# Patient Record
Sex: FEMALE | Race: BLACK OR AFRICAN AMERICAN | NOT HISPANIC OR LATINO | Employment: UNEMPLOYED | URBAN - METROPOLITAN AREA
[De-identification: names, ages, dates, MRNs, and addresses within clinical notes are randomized per-mention and may not be internally consistent; named-entity substitution may affect disease eponyms.]

---
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
## 2018-03-22 ENCOUNTER — TELEPHONE (OUTPATIENT)
Dept: ADMINISTRATIVE | Facility: HOSPITAL | Age: 18
End: 2018-03-22

## 2018-06-20 PROBLEM — N30.00 ACUTE CYSTITIS WITHOUT HEMATURIA: Status: ACTIVE | Noted: 2018-06-20

## 2018-07-18 PROBLEM — J20.9 ACUTE BRONCHITIS: Status: ACTIVE | Noted: 2018-07-18

## 2018-07-18 PROBLEM — S76.012A HIP STRAIN, LEFT, INITIAL ENCOUNTER: Status: ACTIVE | Noted: 2018-07-18

## 2018-07-18 PROBLEM — J30.9 ALLERGIC RHINITIS: Status: ACTIVE | Noted: 2018-07-18

## 2018-11-05 PROBLEM — Z3A.39 39 WEEKS GESTATION OF PREGNANCY: Status: RESOLVED | Noted: 2017-11-28 | Resolved: 2018-11-05

## 2019-04-08 ENCOUNTER — PATIENT OUTREACH (OUTPATIENT)
Dept: ADMINISTRATIVE | Facility: HOSPITAL | Age: 19
End: 2019-04-08

## 2021-05-18 PROBLEM — O21.9 VOMITING DURING PREGNANCY: Status: ACTIVE | Noted: 2021-05-18

## 2022-05-12 ENCOUNTER — TELEPHONE (OUTPATIENT)
Dept: SURGERY | Facility: CLINIC | Age: 22
End: 2022-05-12
Payer: MEDICAID

## 2022-05-12 NOTE — TELEPHONE ENCOUNTER
----- Message from Yoli Zhang sent at 5/12/2022  8:36 AM CDT -----  Troyriana Johnson calling regarding Appointment Access  (message) for #Breast hypertrophy. There is a referral in the system.  [phone]       Looked at the Referral.  Patient was referred to Dr. Mari.  Sent an In Basket message to Carolynn Weinstein NP to let her know that Dr. Mari does not perform breast surgery and that Medicaid is not accepted by the Plastic Surgeon at [facility] for Breast Reduction Surgery.  Suggested [facility].     Called and spoke to Patient.  Explained that above information.  She did not have any questions.

## 2022-08-02 ENCOUNTER — TELEPHONE (OUTPATIENT)
Dept: PLASTIC SURGERY | Facility: CLINIC | Age: 22
End: 2022-08-02
Payer: MEDICAID

## 2022-08-08 ENCOUNTER — TELEPHONE (OUTPATIENT)
Dept: PLASTIC SURGERY | Facility: CLINIC | Age: 22
End: 2022-08-08
Payer: MEDICAID

## 2022-08-19 DIAGNOSIS — N62 HYPERTROPHY OF BREAST: Primary | ICD-10-CM

## 2022-09-12 ENCOUNTER — OFFICE VISIT (OUTPATIENT)
Dept: PLASTIC SURGERY | Facility: CLINIC | Age: 22
End: 2022-09-12
Payer: MEDICAID

## 2022-09-12 VITALS
HEIGHT: 66 IN | RESPIRATION RATE: 20 BRPM | WEIGHT: 133 LBS | TEMPERATURE: 98 F | OXYGEN SATURATION: 98 % | SYSTOLIC BLOOD PRESSURE: 110 MMHG | BODY MASS INDEX: 21.38 KG/M2 | HEART RATE: 98 BPM | DIASTOLIC BLOOD PRESSURE: 76 MMHG

## 2022-09-12 DIAGNOSIS — N62 HYPERTROPHY OF BREAST: Primary | ICD-10-CM

## 2022-09-12 PROCEDURE — 99204 PR OFFICE/OUTPT VISIT, NEW, LEVL IV, 45-59 MIN: ICD-10-PCS | Mod: S$PBB,,, | Performed by: SURGERY

## 2022-09-12 PROCEDURE — 99215 OFFICE O/P EST HI 40 MIN: CPT | Mod: PBBFAC | Performed by: SURGERY

## 2022-09-12 PROCEDURE — 3008F PR BODY MASS INDEX (BMI) DOCUMENTED: ICD-10-PCS | Mod: CPTII,,, | Performed by: SURGERY

## 2022-09-12 PROCEDURE — 3078F PR MOST RECENT DIASTOLIC BLOOD PRESSURE < 80 MM HG: ICD-10-PCS | Mod: CPTII,,, | Performed by: SURGERY

## 2022-09-12 PROCEDURE — 3074F SYST BP LT 130 MM HG: CPT | Mod: CPTII,,, | Performed by: SURGERY

## 2022-09-12 PROCEDURE — 1159F PR MEDICATION LIST DOCUMENTED IN MEDICAL RECORD: ICD-10-PCS | Mod: CPTII,,, | Performed by: SURGERY

## 2022-09-12 PROCEDURE — 3074F PR MOST RECENT SYSTOLIC BLOOD PRESSURE < 130 MM HG: ICD-10-PCS | Mod: CPTII,,, | Performed by: SURGERY

## 2022-09-12 PROCEDURE — 3008F BODY MASS INDEX DOCD: CPT | Mod: CPTII,,, | Performed by: SURGERY

## 2022-09-12 PROCEDURE — 99204 OFFICE O/P NEW MOD 45 MIN: CPT | Mod: S$PBB,,, | Performed by: SURGERY

## 2022-09-12 PROCEDURE — 3078F DIAST BP <80 MM HG: CPT | Mod: CPTII,,, | Performed by: SURGERY

## 2022-09-12 PROCEDURE — 1159F MED LIST DOCD IN RCRD: CPT | Mod: CPTII,,, | Performed by: SURGERY

## 2022-09-12 RX ORDER — SODIUM CHLORIDE 9 MG/ML
INJECTION, SOLUTION INTRAVENOUS CONTINUOUS
Status: CANCELLED | OUTPATIENT
Start: 2022-09-12

## 2022-09-12 RX ORDER — ENOXAPARIN SODIUM 100 MG/ML
40 INJECTION SUBCUTANEOUS
Status: CANCELLED | OUTPATIENT
Start: 2022-09-26 | End: 2022-09-26

## 2022-09-12 NOTE — PROGRESS NOTES
General Surgery  History and Physical  2022    SUBJECTIVE:     Chief Complaint: Hypertrophy of the breast    History of Present Illness:  Patient is a 22-year-old female with a diagnosis of bilateral Macromastia, presenting today in clinic for evaluation of bilateral breast reduction. She is currently a bra size of *** She is always had large breasts, but is now larger than before. She complains of severe bra grooving, intertrigo, rashes, and severe debilitating back and neck pain related to her Macromastia. She can no longer perform the activities of daily living. She cannot exercise, cannot work around the house due to her large breasts. These are hindering her quality of life. She has failed conservative measures for the past 6 months. She has tried NSAIDS, supportive garments in the form of multiple types properly fitted bras, and conservative physical therapy exercises- without improvement, although she has been compliant with the duration over the past 6 months. These are exacerbated when she leans or bends over. She has also tried several over the counter creams and powders to relieve these rashes in the past, but without relief. She has yearly mammograms, as well as recommendations from her primary care, that she be evaluated for reduction. She is otherwise healthy, the macromastia is not related to and endocrine or active metabolic process. She does not smoke.     Past Medical History:  Past Medical History:   Diagnosis Date    Hemorrhoids     Migraines     Pregnancy            Home Medications:  Current Outpatient Medications on File Prior to Visit   Medication Sig    ibuprofen (ADVIL,MOTRIN) 800 MG tablet Take 1 tablet (800 mg total) by mouth every 6 (six) hours as needed for Pain. (Patient not taking: Reported on 2022)     No current facility-administered medications on file prior to visit.       Allergies:  Review of patient's allergies indicates:  No Known Allergies    Surgical  "History:  Past Surgical History:   Procedure Laterality Date    DILATION AND CURETTAGE OF UTERUS         Family History:  Family History   Problem Relation Age of Onset    Thyroid disease Mother     Stroke Father        Social History:  Social History     Tobacco Use    Smoking status: Never    Smokeless tobacco: Never   Substance Use Topics    Alcohol use: No    Drug use: No        Review of Systems:  Skin: No rashes or itching.  Head: Denies headache or recent trauma.  Eyes: Denies eye pain or double vision.  Neck: Denies swelling or hoarseness of voice.  Respiratory: Denies shortness of breath or chest pain  Cardiac: Denies palpitations or swelling in hands/feet.  Gastrointestinal: Denies nausea, denies vomiting.   Urinary: Denies dysuria or hematuria.  Vascular: Denies claudication or leg swelling.  Neuro: Denies motor deficits. Denies weakness.  Endocrine: Denies excessive sweating or cold intolerance.  Psych: Denies memory problems. Denies anxiety.      OBJECTIVE:     Vital Signs:  There were no vitals filed for this visit.     Physical Exam:  General: well developed, well nourished, no distress  HEENT: NCAT, EOMI  Neck: supple, symmetrical  Lungs: Normal WOB, No SOB  Cardiovascular: regular rate  Abdomen: soft, nondistended, nontender to palpation  Skin: Skin color, texture, turgor normal. No rashes or lesions  Musculoskeletal:no clubbing, cyanosis, no deformities  Neurologic: No focal numbness or weakness  Psych/Behavioral:  Alert and oriented, appropriate affect.    Height: 5' 6"  Weight:  BSA:  Focused exam reveals large pendulous breasts with grade 3 ptosis.  There are no masses or lymphadenopathy which is palpable.  There is definite bra grooving.  Sternal notch to nipple- *** cm  Nipple to IMF: *** cm    ASSESSMENT:     I had a long discussion with the patient regarding her options. She will likely need bilateral breast reduction which would provide symptomatic relief with an inferior pedicle, Wise-type " pattern. She has recurrent back pain as well as intertrigo which she will receive some relief from bilateral breast reduction. I discussed risks and benefits are her including bleeding, infection, need for further surgery, damage surrounding structures, loss of nipple, loss of nipple sensation, wound dehiscence, especially at the T point of closure. Most notable risk to the patient with a breast reduction is both nipple sensation loss, as well as discoloration and depigmentation of the nipple. I explained to the patient that although we can perform the surgery safely there is always a chance of nipple loss due to the size of her breast and a loss of blood supply to the nipple. If at the time of surgery it appears that the blood supply to the nipple does not look sufficient, I explained her that we would have to convert her to a free nipple graft. This would entail bolsters for approximately 5 days and increased risk of depigmentation the nipple, with 100% sensory loss. I also explained to the patient thoroughly that in no way can we guarantee a postoperative cup size after breast reduction. I explained her that we would make her smaller, but we cannot guarantee a definitive cup size. I explained her that a breast reduction is a functional operation, and our goal is to improve her functional neck pain, back pain, and rashes. I also explained her that there is in no way that we could attain perfect symmetry - one side will always be slightly larger and more asymmetrical compared to the other postoperatively. She understands this and wishes to proceed. She understands all these risks and she is well informed. We will check with her insurance to see if they will cover this.    PLAN:     - ***need photographs (AP and 2 lateral views)***  - Schedule bilateral breast reduction ***  - Based on preoperative Schnur Sliding scale assessment using height, weight and BSA, I would predict *** grams of tissue per breast to be  removed.       Winston Lambert, MS3  [facility] General Surgery

## 2022-09-12 NOTE — PROGRESS NOTES
Patient seen and examined by Dr. MIRIAN Baird. Scheduled for surgery 9/26/22. Instructions given for preoperative phone interview. All Questions asked and answered. Written and verbal discharge instructions given.

## 2022-09-12 NOTE — PROGRESS NOTES
General Surgery  History and Physical  2022    SUBJECTIVE:     Chief Complaint: Hypertrophy of the breast    History of Present Illness:  Patient is a 22-year-old female with a diagnosis of bilateral Macromastia, presenting today in clinic for evaluation of bilateral breast reduction. She is currently a bra size of G34. She is always had large breasts, but is now larger than before. She complains of severe bra grooving, intertrigo, rashes, and severe debilitating back and neck pain related to her Macromastia. She can no longer perform the activities of daily living. She cannot exercise, cannot work around the house due to her large breasts. These are hindering her quality of life. She has failed conservative measures for the past 6 months. She has tried NSAIDS, supportive garments in the form of multiple types properly fitted bras, and conservative physical therapy exercises- without improvement, although she has been compliant with the duration over the past 6 months. These are exacerbated when she leans or bends over. She has also tried several over the counter creams and powders to relieve these rashes in the past, but without relief. She has yearly mammograms, as well as recommendations from her primary care, that she be evaluated for reduction. She is otherwise healthy, the macromastia is not related to and endocrine or active metabolic process. She smokes occasionally, but stated that she will not smoke until surgery.     Past Medical History:  Past Medical History:   Diagnosis Date    Hemorrhoids     Migraines     Pregnancy            Home Medications:  Current Outpatient Medications on File Prior to Visit   Medication Sig    ibuprofen (ADVIL,MOTRIN) 800 MG tablet Take 1 tablet (800 mg total) by mouth every 6 (six) hours as needed for Pain. (Patient not taking: Reported on 2022)     No current facility-administered medications on file prior to visit.       Allergies:  Review of patient's  "allergies indicates:  No Known Allergies    Surgical History:  Past Surgical History:   Procedure Laterality Date    DILATION AND CURETTAGE OF UTERUS         Family History:  Family History   Problem Relation Age of Onset    Thyroid disease Mother     Arthritis Mother     Miscarriages / Stillbirths Mother     Stroke Father     Diabetes Paternal Grandfather     Arthritis Paternal Grandmother     Cancer Paternal Grandmother     Arthritis Maternal Aunt     Asthma Daughter     Diabetes Maternal Uncle        Social History:  Social History     Tobacco Use    Smoking status: Never    Smokeless tobacco: Never   Substance Use Topics    Alcohol use: No    Drug use: No        Review of Systems:  Skin: No rashes or itching.  Head: Denies headache or recent trauma.  Eyes: Denies eye pain or double vision.  Neck: Denies swelling or hoarseness of voice.  Respiratory: Denies shortness of breath or chest pain  Cardiac: Denies palpitations or swelling in hands/feet.  Gastrointestinal: Denies nausea, denies vomiting.   Urinary: Denies dysuria or hematuria.  Vascular: Denies claudication or leg swelling.  Neuro: Denies motor deficits. Denies weakness.  Endocrine: Denies excessive sweating or cold intolerance.  Psych: Denies memory problems. Denies anxiety.      OBJECTIVE:     Vital Signs:  Vitals:    09/12/22 1332   BP: 110/76   Pulse: 98   Resp: 20   Temp: 98.2 °F (36.8 °C)   Patient Weight: 60.3kg    Physical Exam:  General: well developed, well nourished, no distress  HEENT: NCAT, EOMI  Neck: supple, symmetrical  Lungs: Normal WOB, No SOB  Cardiovascular: regular rate  Abdomen: soft, nondistended, nontender to palpation  Skin: Skin color, texture, turgor normal. No rashes or lesions  Musculoskeletal:no clubbing, cyanosis, no deformities  Neurologic: No focal numbness or weakness  Psych/Behavioral:  Alert and oriented, appropriate affect.    Height: 5' 6"  Weight: 60.3kg  BSA: 1.68 m2  Focused exam reveals large pendulous breasts " with grade 3 ptosis.  There are no masses or lymphadenopathy which is palpable.  There is definite bra grooving.  Sternal notch to nipple- 29.5 cm  Nipple to IMF: 13 cm    ASSESSMENT:     I had a long discussion with the patient regarding her options. She will likely need bilateral breast reduction which would provide symptomatic relief with an inferior pedicle, Wise-type pattern. She has recurrent back pain as well as intertrigo which she will receive some relief from bilateral breast reduction. I discussed risks and benefits are her including bleeding, infection, need for further surgery, damage surrounding structures, loss of nipple, loss of nipple sensation, wound dehiscence, especially at the T point of closure. Most notable risk to the patient with a breast reduction is both nipple sensation loss, as well as discoloration and depigmentation of the nipple. I explained to the patient that although we can perform the surgery safely there is always a chance of nipple loss due to the size of her breast and a loss of blood supply to the nipple. If at the time of surgery it appears that the blood supply to the nipple does not look sufficient, I explained her that we would have to convert her to a free nipple graft. This would entail bolsters for approximately 5 days and increased risk of depigmentation the nipple, with 100% sensory loss. I also explained to the patient thoroughly that in no way can we guarantee a postoperative cup size after breast reduction. I explained her that we would make her smaller, but we cannot guarantee a definitive cup size. I explained her that a breast reduction is a functional operation, and our goal is to improve her functional neck pain, back pain, and rashes. I also explained her that there is in no way that we could attain perfect symmetry - one side will always be slightly larger and more asymmetrical compared to the other postoperatively. She understands this and wishes to  proceed. She understands all these risks and she is well informed. We will check with her insurance to see if they will cover this.    PLAN:     - Obtain photographs (AP and 2 lateral views)  - Schedule bilateral breast reduction for September 26th  - Based on preoperative Schnur Sliding scale assessment using height, weight and BSA, I would predict 338 grams of tissue per breast to be removed.     Paulo Baird MD  General Surgery 3

## 2022-09-19 ENCOUNTER — ANESTHESIA EVENT (OUTPATIENT)
Dept: SURGERY | Facility: HOSPITAL | Age: 22
End: 2022-09-19
Payer: MEDICAID

## 2022-09-19 ENCOUNTER — TELEPHONE (OUTPATIENT)
Dept: SURGERY | Facility: CLINIC | Age: 22
End: 2022-09-19
Payer: MEDICAID

## 2022-09-19 NOTE — ANESTHESIA PREPROCEDURE EVALUATION
09/19/2022  Alanna Patel is a 22 y.o., female with no significant PMHx presents for [place] mammoplasty reduction.    COVID STATUS: TEST DOS   Latest Reference Range & Units 09/26/22 11:17   SARS Coronavirus 2 Antigen Negative  Negative     BETA-BLOCKER: NONE    PAT NURSE PHONE INTERVIEW 9/21/22    PROBLEM LIST:  -  BILATERAL MACROMASTIA  -  UPT STATUS  -  MIGRAINES  -  ER 4/25/22 w/SYNCOPE, PALPITATIONS    AM Rx DOS: NONE    ORDERS -   SURGEON: 4/25/22 CBC, BMP, EKG; NO NEW ORDERS  ANESTHESIA: UPT    Pre-op Assessment    I have reviewed the NPO Status.      Review of Systems  Anesthesia Hx:  No problems with previous Anesthesia    Social:  Non-Smoker    Cardiovascular:  Cardiovascular Normal     Pulmonary:  Pulmonary Normal    Renal/:  Renal/ Normal     Hepatic/GI:  Hepatic/GI Normal    Neurological:  Neurology Normal    Endocrine:  Endocrine Normal      Vitals:    09/26/22 1055 09/26/22 1058 09/26/22 1141 09/26/22 1149   BP:  118/80 118/80 120/72   BP Location:    Right arm   Patient Position:    Lying   Pulse:  69  70   Resp:    20   Temp:  36.6 °C (97.8 °F)  36.3 °C (97.3 °F)   TempSrc:  Oral  Temporal   SpO2:  100%  100%   Weight: 59.9 kg (132 lb 0.9 oz)            Physical Exam  General: Alert, Well nourished and Cooperative    Airway:  Mallampati: II   Mouth Opening: Normal  TM Distance: Normal  Tongue: Normal  Neck ROM: Normal ROM    Dental:  Intact    Chest/Lungs:  Clear to auscultation, Normal Respiratory Rate    Heart:  Rate: Normal  Rhythm: Regular Rhythm  Sounds: Normal       Latest Reference Range & Units 09/26/22 11:16   Preg Test, Ur Negative  Negative     Lab Results   Component Value Date    WBC 6.98 04/25/2022    HGB 12.6 04/25/2022    HCT 40.8 04/25/2022    MCV 86 04/25/2022     04/25/2022       CMP  Sodium   Date Value Ref Range Status   04/25/2022 139 136 - 145  mmol/L Final     Potassium   Date Value Ref Range Status   04/25/2022 4.1 3.5 - 5.1 mmol/L Final     Chloride   Date Value Ref Range Status   04/25/2022 104 95 - 110 mmol/L Final     CO2   Date Value Ref Range Status   04/25/2022 27 23 - 29 mmol/L Final     Glucose   Date Value Ref Range Status   04/25/2022 76 70 - 110 mg/dL Final     BUN   Date Value Ref Range Status   04/25/2022 11 6 - 20 mg/dL Final     Creatinine   Date Value Ref Range Status   04/25/2022 0.9 0.5 - 1.4 mg/dL Final     Calcium   Date Value Ref Range Status   04/25/2022 9.5 8.7 - 10.5 mg/dL Final     Total Protein   Date Value Ref Range Status   11/14/2020 7.9 6.0 - 8.4 g/dL Final     Albumin   Date Value Ref Range Status   11/14/2020 4.3 3.5 - 5.2 g/dL Final     Total Bilirubin   Date Value Ref Range Status   11/14/2020 0.3 0.1 - 1.0 mg/dL Final     Comment:     For infants and newborns, interpretation of results should be based  on gestational age, weight and in agreement with clinical  observations.  Premature Infant recommended reference ranges:  Up to 24 hours.............<8.0 mg/dL  Up to 48 hours............<12.0 mg/dL  3-5 days..................<15.0 mg/dL  6-29 days.................<15.0 mg/dL       Alkaline Phosphatase   Date Value Ref Range Status   11/14/2020 70 55 - 135 U/L Final     AST   Date Value Ref Range Status   11/14/2020 24 10 - 40 U/L Final     ALT   Date Value Ref Range Status   11/14/2020 20 10 - 44 U/L Final     Anion Gap   Date Value Ref Range Status   04/25/2022 8 8 - 16 mmol/L Final     eGFR if    Date Value Ref Range Status   04/25/2022 >60.0 >60 mL/min/1.73 m^2 Final     eGFR if non    Date Value Ref Range Status   04/25/2022 >60.0 >60 mL/min/1.73 m^2 Final     Comment:     Calculation used to obtain the estimated glomerular filtration  rate (eGFR) is the CKD-EPI equation.              Anesthesia Plan  Type of Anesthesia, risks & benefits discussed:    Anesthesia Type: Gen Supraglottic  Airway  Intra-op Monitoring Plan: Standard ASA Monitors  Post Op Pain Control Plan: IV/PO Opioids PRN  Induction:  IV  Airway Plan: Direct  Informed Consent: Informed consent signed with the Patient and all parties understand the risks and agree with anesthesia plan.  All questions answered.   ASA Score: 1  Day of Surgery Review of History & Physical: H&P Update referred to the surgeon/provider.    Ready For Surgery From Anesthesia Perspective.     .

## 2022-09-19 NOTE — TELEPHONE ENCOUNTER
"Called [facility] today (9/21/22) to follow up and ask if they received photos by email.  There were no new notes on this and "Ilia" is the person I spoke with.  She entered notes with notes from our conversation and suggested I call again tomorrow to follow up.      Called [facility] after receiving denial for Bilateral Breast Reduction Mammoplasty surgery.   Auth #[id number], to ask what was needed for a peer to peer.  I then faxed clinic notes from 9/12/22 visit to [facility] Plastic surgery clinic.  Also, asked Jessica Booth from [facility] to email color photos of patient, since  they do not accept faxed photos.Fax confirmation received and email was sent.  Peer to Peer phone interview was done with Dr. Ginette Beal and Dr. Elle Hauser, surgery Resident.on 9/19/22 at 2:40 pm. She was told they will review the new information and although is is labeled as denied, they will re-evaluate and make a decision in a few days.  "

## 2022-09-24 ENCOUNTER — PATIENT MESSAGE (OUTPATIENT)
Dept: ADMINISTRATIVE | Facility: OTHER | Age: 22
End: 2022-09-24
Payer: MEDICAID

## 2022-09-26 ENCOUNTER — HOSPITAL ENCOUNTER (OUTPATIENT)
Facility: HOSPITAL | Age: 22
Discharge: HOME | End: 2022-09-26
Attending: SURGERY | Admitting: SURGERY
Payer: MEDICAID

## 2022-09-26 ENCOUNTER — ANESTHESIA (OUTPATIENT)
Dept: SURGERY | Facility: HOSPITAL | Age: 22
End: 2022-09-26
Payer: MEDICAID

## 2022-09-26 DIAGNOSIS — N62 MACROMASTIA: Primary | ICD-10-CM

## 2022-09-26 DIAGNOSIS — N62 HYPERTROPHY OF BREAST: ICD-10-CM

## 2022-09-26 LAB
B-HCG UR QL: NEGATIVE
CTP QC/QA: YES
CTP QC/QA: YES
SARS-COV-2 AG RESP QL IA.RAPID: NEGATIVE

## 2022-09-26 PROCEDURE — 00402 ANES INTEG SYS RCNSTV BREAST: CPT | Performed by: SURGERY

## 2022-09-26 PROCEDURE — 63600175 PHARM REV CODE 636 W HCPCS: Performed by: ANESTHESIOLOGY

## 2022-09-26 PROCEDURE — 25000003 PHARM REV CODE 250: Performed by: NURSE ANESTHETIST, CERTIFIED REGISTERED

## 2022-09-26 PROCEDURE — 63600175 PHARM REV CODE 636 W HCPCS: Performed by: NURSE ANESTHETIST, CERTIFIED REGISTERED

## 2022-09-26 PROCEDURE — 36000707: Performed by: SURGERY

## 2022-09-26 PROCEDURE — 19318 PR REDUCTION OF LARGE BREAST: ICD-10-PCS | Mod: 50,,, | Performed by: SURGERY

## 2022-09-26 PROCEDURE — 71000016 HC POSTOP RECOV ADDL HR: Performed by: SURGERY

## 2022-09-26 PROCEDURE — 19318 BREAST REDUCTION: CPT | Mod: 50,,, | Performed by: SURGERY

## 2022-09-26 PROCEDURE — 87811 SARS-COV-2 COVID19 W/OPTIC: CPT | Performed by: SURGERY

## 2022-09-26 PROCEDURE — 81025 URINE PREGNANCY TEST: CPT | Performed by: NURSE PRACTITIONER

## 2022-09-26 PROCEDURE — 71000033 HC RECOVERY, INTIAL HOUR: Performed by: SURGERY

## 2022-09-26 PROCEDURE — 37000009 HC ANESTHESIA EA ADD 15 MINS: Performed by: SURGERY

## 2022-09-26 PROCEDURE — 36000706: Performed by: SURGERY

## 2022-09-26 PROCEDURE — 63600175 PHARM REV CODE 636 W HCPCS: Performed by: STUDENT IN AN ORGANIZED HEALTH CARE EDUCATION/TRAINING PROGRAM

## 2022-09-26 PROCEDURE — 71000015 HC POSTOP RECOV 1ST HR: Performed by: SURGERY

## 2022-09-26 PROCEDURE — 25000003 PHARM REV CODE 250: Performed by: ANESTHESIOLOGY

## 2022-09-26 PROCEDURE — 37000008 HC ANESTHESIA 1ST 15 MINUTES: Performed by: SURGERY

## 2022-09-26 RX ORDER — ONDANSETRON 2 MG/ML
4 INJECTION INTRAMUSCULAR; INTRAVENOUS DAILY PRN
Status: DISCONTINUED | OUTPATIENT
Start: 2022-09-26 | End: 2022-09-26 | Stop reason: HOSPADM

## 2022-09-26 RX ORDER — ONDANSETRON 2 MG/ML
INJECTION INTRAMUSCULAR; INTRAVENOUS
Status: DISCONTINUED | OUTPATIENT
Start: 2022-09-26 | End: 2022-09-26

## 2022-09-26 RX ORDER — SODIUM CHLORIDE 9 MG/ML
INJECTION, SOLUTION INTRAVENOUS CONTINUOUS
Status: DISCONTINUED | OUTPATIENT
Start: 2022-09-26 | End: 2022-09-26 | Stop reason: HOSPADM

## 2022-09-26 RX ORDER — HYDROCODONE BITARTRATE AND ACETAMINOPHEN 5; 325 MG/1; MG/1
1 TABLET ORAL EVERY 6 HOURS PRN
Qty: 15 TABLET | Refills: 0 | OUTPATIENT
Start: 2022-09-26 | End: 2022-10-07

## 2022-09-26 RX ORDER — LIDOCAINE HCL/PF 100 MG/5ML
SYRINGE (ML) INTRAVENOUS
Status: DISCONTINUED | OUTPATIENT
Start: 2022-09-26 | End: 2022-09-26

## 2022-09-26 RX ORDER — CEFAZOLIN SODIUM 1 G/3ML
INJECTION, POWDER, FOR SOLUTION INTRAMUSCULAR; INTRAVENOUS
Status: DISCONTINUED | OUTPATIENT
Start: 2022-09-26 | End: 2022-09-26

## 2022-09-26 RX ORDER — KETOROLAC TROMETHAMINE 30 MG/ML
INJECTION, SOLUTION INTRAMUSCULAR; INTRAVENOUS
Status: DISCONTINUED | OUTPATIENT
Start: 2022-09-26 | End: 2022-09-26

## 2022-09-26 RX ORDER — SODIUM CHLORIDE 0.9 % (FLUSH) 0.9 %
10 SYRINGE (ML) INJECTION
Status: DISCONTINUED | OUTPATIENT
Start: 2022-09-26 | End: 2022-09-26 | Stop reason: HOSPADM

## 2022-09-26 RX ORDER — MORPHINE SULFATE 2 MG/ML
INJECTION, SOLUTION INTRAMUSCULAR; INTRAVENOUS
Status: DISCONTINUED
Start: 2022-09-26 | End: 2022-09-26 | Stop reason: HOSPADM

## 2022-09-26 RX ORDER — KETAMINE HCL IN 0.9 % NACL 50 MG/5 ML
SYRINGE (ML) INTRAVENOUS
Status: DISCONTINUED | OUTPATIENT
Start: 2022-09-26 | End: 2022-09-26

## 2022-09-26 RX ORDER — SODIUM CHLORIDE, SODIUM LACTATE, POTASSIUM CHLORIDE, CALCIUM CHLORIDE 600; 310; 30; 20 MG/100ML; MG/100ML; MG/100ML; MG/100ML
INJECTION, SOLUTION INTRAVENOUS CONTINUOUS
Status: DISCONTINUED | OUTPATIENT
Start: 2022-09-26 | End: 2022-09-26 | Stop reason: HOSPADM

## 2022-09-26 RX ORDER — MIDAZOLAM HYDROCHLORIDE 1 MG/ML
2 INJECTION INTRAMUSCULAR; INTRAVENOUS ONCE AS NEEDED
Status: COMPLETED | OUTPATIENT
Start: 2022-09-26 | End: 2022-09-26

## 2022-09-26 RX ORDER — MEPERIDINE HYDROCHLORIDE 25 MG/ML
12.5 INJECTION INTRAMUSCULAR; INTRAVENOUS; SUBCUTANEOUS EVERY 10 MIN PRN
Status: DISCONTINUED | OUTPATIENT
Start: 2022-09-26 | End: 2022-09-26 | Stop reason: HOSPADM

## 2022-09-26 RX ORDER — ENOXAPARIN SODIUM 100 MG/ML
40 INJECTION SUBCUTANEOUS
Status: COMPLETED | OUTPATIENT
Start: 2022-09-26 | End: 2022-09-26

## 2022-09-26 RX ORDER — MIDAZOLAM HYDROCHLORIDE 1 MG/ML
INJECTION INTRAMUSCULAR; INTRAVENOUS
Status: DISCONTINUED
Start: 2022-09-26 | End: 2022-09-26 | Stop reason: HOSPADM

## 2022-09-26 RX ORDER — OXYCODONE HYDROCHLORIDE 5 MG/1
5 TABLET ORAL
Status: DISCONTINUED | OUTPATIENT
Start: 2022-09-26 | End: 2022-09-26 | Stop reason: HOSPADM

## 2022-09-26 RX ORDER — PROPOFOL 10 MG/ML
INJECTION, EMULSION INTRAVENOUS
Status: DISCONTINUED | OUTPATIENT
Start: 2022-09-26 | End: 2022-09-26

## 2022-09-26 RX ORDER — FENTANYL CITRATE 50 UG/ML
INJECTION, SOLUTION INTRAMUSCULAR; INTRAVENOUS
Status: DISCONTINUED | OUTPATIENT
Start: 2022-09-26 | End: 2022-09-26

## 2022-09-26 RX ORDER — LIDOCAINE HYDROCHLORIDE 10 MG/ML
1 INJECTION, SOLUTION EPIDURAL; INFILTRATION; INTRACAUDAL; PERINEURAL ONCE
Status: DISCONTINUED | OUTPATIENT
Start: 2022-09-26 | End: 2022-09-26 | Stop reason: HOSPADM

## 2022-09-26 RX ORDER — MORPHINE SULFATE 2 MG/ML
2 INJECTION, SOLUTION INTRAMUSCULAR; INTRAVENOUS EVERY 5 MIN PRN
Status: DISCONTINUED | OUTPATIENT
Start: 2022-09-26 | End: 2022-09-26 | Stop reason: HOSPADM

## 2022-09-26 RX ORDER — DEXAMETHASONE SODIUM PHOSPHATE 4 MG/ML
INJECTION, SOLUTION INTRA-ARTICULAR; INTRALESIONAL; INTRAMUSCULAR; INTRAVENOUS; SOFT TISSUE
Status: DISCONTINUED | OUTPATIENT
Start: 2022-09-26 | End: 2022-09-26

## 2022-09-26 RX ADMIN — MIDAZOLAM 2 MG: 1 INJECTION INTRAMUSCULAR; INTRAVENOUS at 11:09

## 2022-09-26 RX ADMIN — OXYCODONE HYDROCHLORIDE 5 MG: 5 TABLET ORAL at 02:09

## 2022-09-26 RX ADMIN — Medication 25 MG: at 01:09

## 2022-09-26 RX ADMIN — ENOXAPARIN SODIUM 40 MG: 40 INJECTION SUBCUTANEOUS at 11:09

## 2022-09-26 RX ADMIN — FENTANYL CITRATE 50 MCG: 50 INJECTION, SOLUTION INTRAMUSCULAR; INTRAVENOUS at 01:09

## 2022-09-26 RX ADMIN — ONDANSETRON 4 MG: 2 INJECTION INTRAMUSCULAR; INTRAVENOUS at 01:09

## 2022-09-26 RX ADMIN — FENTANYL CITRATE 50 MCG: 50 INJECTION, SOLUTION INTRAMUSCULAR; INTRAVENOUS at 12:09

## 2022-09-26 RX ADMIN — DEXAMETHASONE SODIUM PHOSPHATE 8 MG: 4 INJECTION, SOLUTION INTRA-ARTICULAR; INTRALESIONAL; INTRAMUSCULAR; INTRAVENOUS; SOFT TISSUE at 12:09

## 2022-09-26 RX ADMIN — Medication 25 MG: at 12:09

## 2022-09-26 RX ADMIN — LIDOCAINE HYDROCHLORIDE 40 MG: 20 INJECTION, SOLUTION INTRAVENOUS at 12:09

## 2022-09-26 RX ADMIN — SODIUM CHLORIDE, POTASSIUM CHLORIDE, SODIUM LACTATE AND CALCIUM CHLORIDE: 600; 310; 30; 20 INJECTION, SOLUTION INTRAVENOUS at 11:09

## 2022-09-26 RX ADMIN — MORPHINE SULFATE 2 MG: 2 INJECTION, SOLUTION INTRAMUSCULAR; INTRAVENOUS at 02:09

## 2022-09-26 RX ADMIN — FENTANYL CITRATE 25 MCG: 50 INJECTION, SOLUTION INTRAMUSCULAR; INTRAVENOUS at 01:09

## 2022-09-26 RX ADMIN — KETOROLAC TROMETHAMINE 30 MG: 30 INJECTION, SOLUTION INTRAMUSCULAR; INTRAVENOUS at 01:09

## 2022-09-26 RX ADMIN — CEFAZOLIN 2 G: 330 INJECTION, POWDER, FOR SOLUTION INTRAMUSCULAR; INTRAVENOUS at 12:09

## 2022-09-26 RX ADMIN — PROPOFOL 150 MG: 10 INJECTION, EMULSION INTRAVENOUS at 12:09

## 2022-09-26 NOTE — ANESTHESIA POSTPROCEDURE EVALUATION
Anesthesia Post Evaluation    Patient: Alanna Patel    Procedure(s) Performed: Procedure(s) (LRB):  MAMMOPLASTY, REDUCTION, BILATERAL (Bilateral)    Final Anesthesia Type: general      Patient location during evaluation: DOSC  Level of consciousness: awake  Post-procedure vital signs: reviewed and stable  Airway patency: patent      Anesthetic complications: no      Cardiovascular status: hemodynamically stable  Respiratory status: spontaneous ventilation  Follow-up not needed.          Vitals Value Taken Time   /87 09/26/22 1507   Temp 36.2 °C (97.2 °F) 09/26/22 1507   Pulse 77 09/26/22 1507   Resp 15 09/26/22 1507   SpO2 100 % 09/26/22 1507         Event Time   Out of Recovery 14:40:00         Pain/Gerald Score: Pain Rating Prior to Med Admin: 6 (9/26/2022  2:56 PM)  Gerald Score: 9 (9/26/2022  3:09 PM)

## 2022-09-26 NOTE — DISCHARGE INSTRUCTIONS
Stitches are dissolving on the inside  May alternate ibuprofen with pain meds but do not take extra tylenol  No lifting > 15 lbs x 6 weeks  May remove bandage tomorrow and shower then replace bandage immediately   RTC in 2 weeks  Seek emergency help if running a fever >100.4 not responding to tylenol, pus draining from breast, severe swelling or increase in pain that isn't bearable with pain meds

## 2022-09-26 NOTE — TRANSFER OF CARE
Anesthesia Transfer of Care Note    Patient: Alanna Patel    Procedure(s) Performed: Procedure(s) (LRB):  MAMMOPLASTY, REDUCTION, BILATERAL (Bilateral)    Patient location: PACU    Anesthesia Type: general    Transport from OR: Transported from OR on room air with adequate spontaneous ventilation    Post pain: adequate analgesia    Post assessment: no apparent anesthetic complications and tolerated procedure well    Post vital signs: stable    Level of consciousness: sedated    Nausea/Vomiting: no nausea/vomiting    Complications: none    Transfer of care protocol was followed

## 2022-09-26 NOTE — LETTER
September 26, 2022    [facility]     [address] [address]  Phone: [phone]  Fax: [phone]       Date of Visit: 09/26/2022    To Whom It May Concern:    Please be advised that under state and federal laws as it relates to patient privacy and Health Insurance Accountability Act (HIPAA), we can not release our patient(s) name without authorization. Although, we can confirm that the individual listed below did accompany a person to our facility for healthcare services to be provided.    This document confirms that Gregorio Mandujanooine accompanied a patient to our facility on 09/26/2022.    Sincerely,     Yamilet Huynh RN

## 2022-09-26 NOTE — INTERVAL H&P NOTE
The patient has been examined and the H&P has been reviewed:    I concur with the findings and no changes have occurred since H&P was written.    NPO since midnight. To OR for bilateral breast reduction.    Surgery risks, benefits and alternative options discussed and understood by patient/family.          Active Hospital Problems   No active problems to display.      Resolved Hospital Problems    Diagnosis Date Resolved POA    Macromastia [N62] 09/26/2022 Yes

## 2022-09-26 NOTE — DISCHARGE SUMMARY
[facility] - Periop Services  Discharge Note  Short Stay    Procedure(s) (LRB):  MAMMOPLASTY, REDUCTION, BILATERAL (Bilateral)    OUTCOME: Patient tolerated treatment/procedure well without complication and is now ready for discharge.    DISPOSITION: Home or Self Care    FINAL DIAGNOSIS:  Macromastia    FOLLOWUP: In clinic    DISCHARGE INSTRUCTIONS:    Discharge Procedure Orders   Diet Adult Regular     Lifting restrictions   Order Comments: No heavy lifting greater than 10 lbs until clinic visit.     Notify your health care provider if you experience any of the following:  temperature >100.4     Notify your health care provider if you experience any of the following:  persistent nausea and vomiting or diarrhea     Notify your health care provider if you experience any of the following:  severe uncontrolled pain     Notify your health care provider if you experience any of the following:  redness, tenderness, or signs of infection (pain, swelling, redness, odor or green/yellow discharge around incision site)     Remove dressing in 48 hours   Order Comments: Ok to shower in 2 days, no bath/pool for 2 weeks.     Lifting restrictions   Order Comments: No heavy lifting greater than 20lbs for 2 weeks.     Notify your health care provider if you experience any of the following:  redness, tenderness, or signs of infection (pain, swelling, redness, odor or green/yellow discharge around incision site)     Notify your health care provider if you experience any of the following:  severe uncontrolled pain     Remove dressing in 48 hours        TIME SPENT ON DISCHARGE: 20 minutes

## 2022-09-26 NOTE — OP NOTE
DATE OF SURGERY:    9/26/22    SURGEON:  Varun Gonsalez MD,      PREOPERATIVE DIAGNOSIS:  Bilateral Macromastia.    POSTOPERATIVE DIAGNOSIS:  Bilateral Macromastia.    PROCEDURE:  Bilateral breast reduction.    INDICATIONS FOR PROCEDURE:  This is a female with a history of very large breasts.  These are extremely heavy and causing recurrent rashes and bra grooving as well as intertrigo.  She has had large breasts her whole life and this is inhibiting her quality of life and causing severe disability.  She presents for bilateral breast reduction.    ANESTHESIA:  General.    COMPLICATIONS:  None.    EBL: 100cc        PROCEDURE IN DETAIL:  The patient was endotracheally intubated and prepped and draped in the usual sterile fashion.  We first turned our attention to the right breast.  We first marked out a 10 cm pedicle using a ruler and a marking pen.  This was an inferior type pedicle.  We then placed a breast tourniquet.  We used a 42 mm cookie cutter to outline an areolar incision.  Using a 10 blade scalpel we made an incision around the areolar and outlined the 10 cm pedicle.  The entire inferior pedicle was de-epithelialized using a 10 blade scalpel.  The breast tourniquet was then released and de-epithelization was completed using sharp curved Navarro scissors.  We then used a 10 blade scalpel to outline the Goldsmith pattern skin resection.  We raised a superior flap using the Bovie cautery down to the level of the pectoralis major muscle.  A pocket was then created using the Bovie cautery staying superior to the pectoralis major all the way up to the clavicle.  After the superior flap was elevated we then performed our resection first medially and then centrally and then laterally using Bovie cautery.  The breast tissue was removed in its entirety and continuity.  After the breast was removed this was weighed.   After this was completed the entire operative bed was irrigated out with sterile saline solution.   Hemostasis was achieved using the Bovie cautery.  The inferior pedicle was the plicated using interrupted 0 Vicryl sutures.  After this was completed we then closed the superior flap over the inferior pedicle, closing the entirety of the Goldsmith pattern using a combination of Adson's and staplers.  This size and shape was excellent.  We then turned our attention to the left.      In a similar fashion an inferior pedicle was outlined which was 10 cm and completely de-epithelialized using a 10 blade scalpel after a 42 mm cookie cutter was used to cut out the nipple.  After this was completed we then raised superior flaps in an identical fashion first incising the skin using a 10 blade scalpel and raising the flap using Bovie cautery down to pectoralis major muscle.  A pocket was then created for the inferior pedicle.  We then removed the medial, superior, and lateral segments.After this was completed the entirety of the operative bed was irrigated out with sterile saline solution and Bovie cautery was used to obtain hemostasis.  Superior flaps were then closed over the inferior pedicle after the pedicle was plicated using interrupted 0 Vicryl sutures.  The Goldsmith pattern was closed using staples.  We then began to close.  We closed the deep tissues using interrupted 0 Vicryl pops.  We then closed the skin using a running 4-0 monofilament suture.  We then sat the patient up in a 90 degree position.  We marked out the nipples using 42 mm cookie cutters.  The nipples were cut out and brought up through superior flaps and the deep tissues were sewn into place using interrupted 3-0 Vicryl pops.  We then closed the areola using a running 4-0 monofilament suture.  The patient was then placed in a sterile dressing consisting of fluff and a surgical bra.  There were no complications.  I was scrubbed and present for the entire procedure.

## 2022-09-27 VITALS
WEIGHT: 132.06 LBS | RESPIRATION RATE: 15 BRPM | SYSTOLIC BLOOD PRESSURE: 139 MMHG | HEART RATE: 79 BPM | OXYGEN SATURATION: 100 % | BODY MASS INDEX: 21.31 KG/M2 | TEMPERATURE: 97 F | DIASTOLIC BLOOD PRESSURE: 85 MMHG

## 2022-09-29 LAB
ESTROGEN SERPL-MCNC: NORMAL PG/ML
INSULIN SERPL-ACNC: NORMAL U[IU]/ML
LAB AP CLINICAL INFORMATION: NORMAL
LAB AP GROSS DESCRIPTION: NORMAL
LAB AP REPORT FOOTNOTES: NORMAL
T3RU NFR SERPL: NORMAL %

## 2022-10-08 ENCOUNTER — HOSPITAL ENCOUNTER (EMERGENCY)
Facility: HOSPITAL | Age: 22
Discharge: HOME | End: 2022-10-08
Attending: FAMILY MEDICINE
Payer: MEDICAID

## 2022-10-08 VITALS
OXYGEN SATURATION: 100 % | HEIGHT: 66 IN | DIASTOLIC BLOOD PRESSURE: 61 MMHG | WEIGHT: 142.44 LBS | TEMPERATURE: 99 F | HEART RATE: 80 BPM | BODY MASS INDEX: 22.89 KG/M2 | RESPIRATION RATE: 18 BRPM | SYSTOLIC BLOOD PRESSURE: 116 MMHG

## 2022-10-08 DIAGNOSIS — Z48.89 ENCOUNTER FOR POST SURGICAL WOUND CHECK: Primary | ICD-10-CM

## 2022-10-08 PROCEDURE — 99282 EMERGENCY DEPT VISIT SF MDM: CPT

## 2022-10-08 NOTE — CONSULTS
General Surgery  Consult Note    SUBJECTIVE:     Chief Complaint:   Per triage note--Post-op Problem (States had breast reduction . States Friday picked up on daughter to wash hair and it started bleeding. Appears small  area of dehisence noted left breast)      History of Present Illness:  Ms. Patel is a 22 y.o. female w/ a medical hx b/l mammoplasty on  who presented to the ED with L breast incisional pain, numbness, and bloody drainage since Thursday after she lifted her daughter. Endorses mild chest pain, dizziness, and lightheaded but denies fever and dyspnea.     Past Medical History:  Past Medical History:   Diagnosis Date    Hemorrhoids     Migraines     Pregnancy            Home Medications:  Current Facility-Administered Medications on File Prior to Encounter   Medication    [COMPLETED] ketorolac injection 30 mg     Current Outpatient Medications on File Prior to Encounter   Medication Sig    HYDROcodone-acetaminophen (NORCO) 5-325 mg per tablet Take 1 tablet by mouth every 6 (six) hours as needed for Pain.       Allergies:  Review of patient's allergies indicates:  No Known Allergies    Surgical History:  Past Surgical History:   Procedure Laterality Date    DILATION AND CURETTAGE OF UTERUS      REDUCTION OF BOTH BREASTS Bilateral 2022    Procedure: MAMMOPLASTY, REDUCTION, BILATERAL;  Surgeon: Varun Gonsalez MD;  Location: [facility];  Service: Plastics;  Laterality: Bilateral;       Family History:  Family History   Problem Relation Age of Onset    Thyroid disease Mother     Arthritis Mother     Miscarriages / Stillbirths Mother     Stroke Father     Diabetes Paternal Grandfather     Arthritis Paternal Grandmother     Cancer Paternal Grandmother     Arthritis Maternal Aunt     Asthma Daughter     Diabetes Maternal Uncle        Social History:  Social History     Tobacco Use    Smoking status: Never    Smokeless tobacco: Never   Substance Use Topics    Alcohol use: No    Drug use: No         Review of Systems:  Skin: No rashes or itching.  Head: Denies headache or recent trauma.  Eyes: Denies eye pain or double vision.  Neck: Denies swelling or hoarseness of voice.  Respiratory: Denies shortness of breath. Positive for L breast incisional pain, numbness, and drainage.   Cardiac: Denies palpitations or swelling in hands/feet.  Gastrointestinal: Denies nausea, denies vomiting.   Urinary: Denies dysuria or hematuria.  Vascular: Denies claudication or leg swelling.  Neuro: Denies motor deficits. Denies weakness. Positive for dizziness and lightheaded.   Endocrine: Denies excessive sweating or cold intolerance.  Psych: Denies memory problems. Denies anxiety.      OBJECTIVE:     Vital Signs:  Temp: 98.6 °F (37 °C) (10/08/22 1323)  Pulse: 74 (10/08/22 1344)  Resp: 20 (10/08/22 1323)  BP: 118/75 (10/08/22 1344)  SpO2: 100 % (10/08/22 1344)    Physical Exam:  General: well developed, well nourished, no distress  HEENT: NCAT, EOMI  Neck: supple, symmetrical  Lungs: Normal WOB, No SOB  Cardiovascular: regular rate  Abdomen: soft, nondistended, nontender to palpation.  Skin: Skin color, texture, turgor normal. No rashes or lesions. Mild L breast pain and bloody drainage with overlying skin intact and no purulent drainage.   Musculoskeletal:no clubbing, cyanosis, no deformities  Neurologic: No focal numbness or weakness  Psych/Behavioral:  Alert and oriented, appropriate affect.    Laboratory:  Labs Reviewed - No data to display      Diagnostic Results:  Imaging Results    None          ASSESSMENT:     Ms. Patel is a 22 y.o. female w/ a medical hx b/l mammoplasty on 9/12 who presented to the ED with L breast incisional pain, numbness, and bloody drainage since Thursday after she lifted her daughter.    PLAN:     - No evidence of infection but there is a hematoma of L breast w/o active drainage.  - There is no overlying erythema or purulent discharge.  - Informed patient to wash incisions with soap and water  twice daily.  - Daily dressing changes with gauze and tape.  - Will follow up in clinic on Monday.    Anusha Warren MD  [employer] General Surgery PGY-1

## 2022-10-08 NOTE — ED PROVIDER NOTES
Encounter Date: 10/8/2022       History     Chief Complaint   Patient presents with    Post-op Problem     States had breast reduction . States Friday picked up on daughter to wash hair and it started bleeding. Appears small  area of dehisence noted left breast     Patient is s/p bilateral breast reduction on  here at [facility]. 2 days ago she was lifting her child and felt a pop under her left breast and subsequently she noticed bleeding from small area of surgical incision, she lives in [place] and was seen yesterday at [facility], she continues to have intermittent bleeding from incision, she denies any fever or erythema to site, she has a follow up appointment Monday in surgery clinic    Review of patient's allergies indicates:  No Known Allergies  Past Medical History:   Diagnosis Date    Hemorrhoids     Migraines     Pregnancy          Past Surgical History:   Procedure Laterality Date    DILATION AND CURETTAGE OF UTERUS      REDUCTION OF BOTH BREASTS Bilateral 2022    Procedure: MAMMOPLASTY, REDUCTION, BILATERAL;  Surgeon: Varun Gonsalez MD;  Location: [facility];  Service: Plastics;  Laterality: Bilateral;     Family History   Problem Relation Age of Onset    Thyroid disease Mother     Arthritis Mother     Miscarriages / Stillbirths Mother     Stroke Father     Diabetes Paternal Grandfather     Arthritis Paternal Grandmother     Cancer Paternal Grandmother     Arthritis Maternal Aunt     Asthma Daughter     Diabetes Maternal Uncle      Social History     Tobacco Use    Smoking status: Never    Smokeless tobacco: Never   Substance Use Topics    Alcohol use: No    Drug use: No     Review of Systems   Constitutional:  Negative for fever.   HENT:  Negative for sore throat.    Respiratory:  Negative for shortness of breath.    Cardiovascular:  Negative for chest pain.   Gastrointestinal:  Negative for nausea.   Genitourinary:  Negative for dysuria.   Musculoskeletal:  Negative for back pain.    Skin:  Positive for wound. Negative for rash.   Neurological:  Negative for weakness.   Hematological:  Does not bruise/bleed easily.   All other systems reviewed and are negative.    Physical Exam     Initial Vitals [10/08/22 1323]   BP Pulse Resp Temp SpO2   103/74 73 20 98.6 °F (37 °C) 100 %      MAP       --         Physical Exam    Nursing note and vitals reviewed.  Constitutional: She appears well-developed and well-nourished.   HENT:   Head: Normocephalic and atraumatic.   Neck: Neck supple.   Normal range of motion.  Cardiovascular:  Normal rate, regular rhythm, normal heart sounds and intact distal pulses.           Pulmonary/Chest: Breath sounds normal.   Surgical incisions beneath anne marie breasts healing well except for a small open area under left breast with blood on dressing that patient has applied, no active bleeding at this time, no erythema or s/s infection   Abdominal: Abdomen is soft. Bowel sounds are normal.   Musculoskeletal:         General: Normal range of motion.      Cervical back: Normal range of motion and neck supple.     Neurological: She is alert. She has normal strength.   Skin: Skin is warm and dry.   Psychiatric: She has a normal mood and affect.       ED Course   Procedures  Labs Reviewed - No data to display       Imaging Results    None          Medications - No data to display  Medical Decision Making:   History:   Old Records Summarized: records from clinic visits and records from previous admission(s).  Other:   I have discussed this case with another health care provider.       <> Summary of the Discussion: Consulted Dr Lopez (surgery) who saw patient in ER with Dr Baird. Dressing was applied to site and they will see her Monday in clinic, patient in full agreement with plan  2:38 PM DISPOSITION: The patient is resting comfortably in no acute distress.  She is hemodynamically stable and is without objective evidence for acute process requiring urgent intervention or  hospitalization. I provided counseling to patient with regard to condition, the treatment plan, specific conditions for return, and the importance of follow up. Detailed written and verbal instructions provided to patient and she expressed a verbal understanding of the discharge instructions and overall management plan. Reiterated the importance of medication administration and safety and advised patient to follow up with primary care provider in 3-5 days or sooner if needed.  Answered questions at this time. The patient is stable for discharge.                           Clinical Impression:   Final diagnoses:  [Z48.89] Encounter for post surgical wound check (Primary)      ED Disposition Condition    Discharge Stable          ED Prescriptions    None       Follow-up Information       Follow up With Specialties Details Why Contact Info    follow up at scheduled surgery clinic appointment on Monday 10/10        [facility] - Emergency Dept Emergency Medicine  If symptoms worsen [address] [facility] [id number] [phone]             MINGO Morales  10/08/22 0538

## 2022-10-10 ENCOUNTER — OFFICE VISIT (OUTPATIENT)
Dept: PLASTIC SURGERY | Facility: CLINIC | Age: 22
End: 2022-10-10
Payer: MEDICAID

## 2022-10-10 VITALS
BODY MASS INDEX: 20.89 KG/M2 | HEIGHT: 66 IN | WEIGHT: 130 LBS | OXYGEN SATURATION: 100 % | DIASTOLIC BLOOD PRESSURE: 72 MMHG | RESPIRATION RATE: 20 BRPM | SYSTOLIC BLOOD PRESSURE: 113 MMHG | HEART RATE: 89 BPM | TEMPERATURE: 98 F

## 2022-10-10 DIAGNOSIS — N62 MACROMASTIA: Primary | ICD-10-CM

## 2022-10-10 PROCEDURE — 99024 POSTOP FOLLOW-UP VISIT: CPT | Mod: ,,, | Performed by: SURGERY

## 2022-10-10 PROCEDURE — 1159F PR MEDICATION LIST DOCUMENTED IN MEDICAL RECORD: ICD-10-PCS | Mod: CPTII,,, | Performed by: SURGERY

## 2022-10-10 PROCEDURE — 3008F BODY MASS INDEX DOCD: CPT | Mod: CPTII,,, | Performed by: SURGERY

## 2022-10-10 PROCEDURE — 3074F PR MOST RECENT SYSTOLIC BLOOD PRESSURE < 130 MM HG: ICD-10-PCS | Mod: CPTII,,, | Performed by: SURGERY

## 2022-10-10 PROCEDURE — 1159F MED LIST DOCD IN RCRD: CPT | Mod: CPTII,,, | Performed by: SURGERY

## 2022-10-10 PROCEDURE — 3078F PR MOST RECENT DIASTOLIC BLOOD PRESSURE < 80 MM HG: ICD-10-PCS | Mod: CPTII,,, | Performed by: SURGERY

## 2022-10-10 PROCEDURE — 99024 PR POST-OP FOLLOW-UP VISIT: ICD-10-PCS | Mod: ,,, | Performed by: SURGERY

## 2022-10-10 PROCEDURE — 3008F PR BODY MASS INDEX (BMI) DOCUMENTED: ICD-10-PCS | Mod: CPTII,,, | Performed by: SURGERY

## 2022-10-10 PROCEDURE — 3078F DIAST BP <80 MM HG: CPT | Mod: CPTII,,, | Performed by: SURGERY

## 2022-10-10 PROCEDURE — 99213 OFFICE O/P EST LOW 20 MIN: CPT | Mod: PBBFAC | Performed by: SURGERY

## 2022-10-10 PROCEDURE — 3074F SYST BP LT 130 MM HG: CPT | Mod: CPTII,,, | Performed by: SURGERY

## 2022-10-10 RX ORDER — BACITRACIN 500 [USP'U]/G
OINTMENT TOPICAL 2 TIMES DAILY
Qty: 1 EACH | Refills: 0 | Status: SHIPPED | OUTPATIENT
Start: 2022-10-10 | End: 2023-06-28

## 2022-10-10 NOTE — PROGRESS NOTES
"Surgery Clinic Note     CC: f/u s/p bilateral breast reduction on     HPI: Alanna Patel is a 21 yo F w no significant PMH. She is 14 days post op following a bilateral breast reduction on 22. She was healing well until 2 days ago when she lifted her child and popped the stitches under her left breast. She is now complaining of pain with bruising. The site is draining dark red blood. She states that there is no pain in her right breast. No other complaints.    PMH:    Hemorrhoids      Migraines      Pregnancy            PSH:   Dilation and curettage of uterus  Bilateral breast reduction     Fam Hx:    Thyroid disease Mother      Arthritis Mother      Miscarriages / Stillbirths Mother      Stroke Father      Diabetes Paternal Grandfather      Arthritis Paternal Grandmother      Cancer Paternal Grandmother      Arthritis Maternal Aunt      Asthma Daughter      Diabetes Maternal Uncle      Social Hx: No tobacco use. No alcohol or illicit drug use.    Allergies: NKDA    ROS: Negative except above     Current Outpatient Medications on File Prior to Visit   Medication Sig Dispense Refill    HYDROcodone-acetaminophen (NORCO) 5-325 mg per tablet Take 1 tablet by mouth every 6 (six) hours as needed for Pain. 12 tablet 0     No current facility-administered medications on file prior to visit.       Physical Exam  /72 (BP Location: Left arm, Patient Position: Sitting, BP Method: Medium (Automatic))   Pulse 89   Temp 98.2 °F (36.8 °C) (Oral)   Resp 20   Ht 5' 5.75" (1.67 m)   Wt 59 kg (130 lb)   LMP 2022   SpO2 100%   BMI 21.14 kg/m²   General: NAD, AAO X 3  CV: Regular rate and rhythm without murmurs  Resp: Clear to ascultation bilaterally  Abdomen: soft, non-tender, non-distended, bowel sounds present    Surgical Pathology:   1. Left breast tissue, reduction mammoplasty:    - Benign breast with fibrocystic changes consisting of abundant sclerosing fibrosis and mild sclerosing adenosis. "  No evidence of malignancy or atypia.     2. Right breast tissue, reduction mammoplasty:   - Benign breast with fibrocystic changes consisting of abundant sclerosing fibrosis and sclerosing adenosis.  Small fibroadenoma identified. No evidence of malignancy or atypia.     ASSESSMENT/PLAN  Alanna Patel is a 23 yo F w no significant PMH. She is 14 days post op following a bilateral breast reduction on 9/26/22. New onset pain and wound site drainage    -Continue to change dressing 2x / day. Apply neosporin to wound site   -RTC in 2 weeks     Christen Abbott MS3    Agree with student note as above.    - Daily dressing changes with neosporin/bacitracin  - Keep wound clean and dry at all times  - No underwire bras, sports bras only  - Return to clinic in 2 weeks to re-evaluate wound  - Pt counseled on warning signs and reasons to return to ED or clinic sooner than 2 weeks.    Kristin Mullins MD  [employer] Surgery PGY3

## 2022-11-07 ENCOUNTER — OFFICE VISIT (OUTPATIENT)
Dept: PLASTIC SURGERY | Facility: CLINIC | Age: 22
End: 2022-11-07
Payer: MEDICAID

## 2022-11-07 VITALS
OXYGEN SATURATION: 100 % | SYSTOLIC BLOOD PRESSURE: 106 MMHG | HEIGHT: 66 IN | RESPIRATION RATE: 20 BRPM | BODY MASS INDEX: 20.89 KG/M2 | DIASTOLIC BLOOD PRESSURE: 65 MMHG | HEART RATE: 66 BPM | WEIGHT: 130 LBS | TEMPERATURE: 98 F

## 2022-11-07 DIAGNOSIS — Z98.82 S/P AUGMENTATION MAMMOPLASTY: Primary | ICD-10-CM

## 2022-11-07 PROCEDURE — 99213 OFFICE O/P EST LOW 20 MIN: CPT | Mod: PBBFAC | Performed by: SURGERY

## 2022-11-07 PROCEDURE — 99024 PR POST-OP FOLLOW-UP VISIT: ICD-10-PCS | Mod: ,,, | Performed by: SURGERY

## 2022-11-07 PROCEDURE — 1159F MED LIST DOCD IN RCRD: CPT | Mod: CPTII,,, | Performed by: SURGERY

## 2022-11-07 PROCEDURE — 3074F SYST BP LT 130 MM HG: CPT | Mod: CPTII,,, | Performed by: SURGERY

## 2022-11-07 PROCEDURE — 3078F PR MOST RECENT DIASTOLIC BLOOD PRESSURE < 80 MM HG: ICD-10-PCS | Mod: CPTII,,, | Performed by: SURGERY

## 2022-11-07 PROCEDURE — 3078F DIAST BP <80 MM HG: CPT | Mod: CPTII,,, | Performed by: SURGERY

## 2022-11-07 PROCEDURE — 3008F BODY MASS INDEX DOCD: CPT | Mod: CPTII,,, | Performed by: SURGERY

## 2022-11-07 PROCEDURE — 3008F PR BODY MASS INDEX (BMI) DOCUMENTED: ICD-10-PCS | Mod: CPTII,,, | Performed by: SURGERY

## 2022-11-07 PROCEDURE — 1159F PR MEDICATION LIST DOCUMENTED IN MEDICAL RECORD: ICD-10-PCS | Mod: CPTII,,, | Performed by: SURGERY

## 2022-11-07 PROCEDURE — 3074F PR MOST RECENT SYSTOLIC BLOOD PRESSURE < 130 MM HG: ICD-10-PCS | Mod: CPTII,,, | Performed by: SURGERY

## 2022-11-07 PROCEDURE — 99024 POSTOP FOLLOW-UP VISIT: CPT | Mod: ,,, | Performed by: SURGERY

## 2022-11-07 NOTE — PROGRESS NOTES
"[facility] Surgery Clinic Note    HPI:   Ms. Patel is a 23 yo F with hx of bilateral mammoplasty on  who presented to the ED on 10/08 with L breast incisional pain, numbness, and bloody discharge after she "lifted her daughter." Patient c/o 3/10 bilateral breast pain that worsens with movement and at night. She no longer has a bloody discharge from her L breast, but she noticed a lump on her Left medial breast 2 weeks ago. She states that the lump is not painful to palpation and does not bother her. She reports her L breast has been warm for past 2 weeks. Patient denies fever, chill, N/V/D, and purulent discharge from incision sites.       PMH:   Past Medical History:   Diagnosis Date    Hemorrhoids     Migraines     Pregnancy           Meds:   Current Outpatient Medications:     bacitracin 500 unit/gram ointment, Apply topically 2 (two) times daily. Apply to wound BID or more often as needed for dressing changes. (Patient not taking: Reported on 2022), Disp: 1 each, Rfl: 0    HYDROcodone-acetaminophen (NORCO) 5-325 mg per tablet, Take 1 tablet by mouth every 6 (six) hours as needed for Pain. (Patient not taking: Reported on 2022), Disp: 12 tablet, Rfl: 0  Allergies: Review of patient's allergies indicates:  No Known Allergies  Social History:   Social History     Tobacco Use    Smoking status: Never    Smokeless tobacco: Never   Substance Use Topics    Alcohol use: No    Drug use: No     Family History:   Family History   Problem Relation Age of Onset    Thyroid disease Mother     Arthritis Mother     Miscarriages / Stillbirths Mother     Stroke Father     Diabetes Paternal Grandfather     Arthritis Paternal Grandmother     Cancer Paternal Grandmother     Arthritis Maternal Aunt     Asthma Daughter     Diabetes Maternal Uncle      Surgical History:   Past Surgical History:   Procedure Laterality Date    DILATION AND CURETTAGE OF UTERUS      REDUCTION OF BOTH BREASTS Bilateral 2022    " Procedure: MAMMOPLASTY, REDUCTION, BILATERAL;  Surgeon: Varun Gonsalez MD;  Location: [facility] OR;  Service: Plastics;  Laterality: Bilateral;     Review of Systems:    Skin: warm L breast. No redness or itchiness  Neck: Denies swelling or hoarseness of voice.  Respiratory: Denies shortness of breath or chest pain  Cardiac: Denies palpitations or swelling in hands/feet.  Gastrointestinal: Denies nausea, denies vomiting.   Neuro: Denies motor deficits. Denies weakness.      Objective:    Vitals:  Vitals:    11/07/22 1324   BP: 106/65   Pulse: 66   Resp: 20   Temp: 97.7 °F (36.5 °C)        Physical Exam:  Gen: NAD  Neuro: awake, alert, answering questions appropriately  Resp: non-labored breathing  Breast: 3 cm area of fluctuance over previous wound dehiscence on left inframammary incision. No erythema, induration, or open wound. Non-tender.; L breast skin; Bilateral breast no pain to palpation and no drainage  Ext: moves all 4 spontaneously and purposefully  Skin: warm, well perfused    Assessment/Plan:  Ms. Patel is a 21 yo female s/p bilateral mammoplasty 9/26/2022. She presents today with a small fluid collection underneath previous inframammary incision.    - Area of concern is a small hematoma that resulted after wound dehisced. The wound is now closed and the area is getting smaller. It is non-tender and not erythematous. Reassured her that it will resorb on its own.  - Follow up PRN.    Paulo Baird MD  General Surgery HO3

## 2022-11-07 NOTE — PROGRESS NOTES
Pt seen by Dr. Baird; Pt instructed to return to clinic as needed; Discharge paperwork given w/pt verbalizing understanding

## 2023-06-28 PROBLEM — F41.9 ANXIETY: Status: ACTIVE | Noted: 2023-06-28

## 2024-07-27 ENCOUNTER — PATIENT MESSAGE (OUTPATIENT)
Dept: ADMINISTRATIVE | Facility: OTHER | Age: 24
End: 2024-07-27

## (undated) DEVICE — GOWN POLY REINF BRTH SLV XL

## (undated) DEVICE — GLOVE PROTEXIS LTX MICRO 6.5

## (undated) DEVICE — GLOVE PROTEXIS LTX MICRO 8

## (undated) DEVICE — ELECTRODE BLADE INSULATED 1 IN

## (undated) DEVICE — SOL 9P NACL IRR PIC IL

## (undated) DEVICE — SUT 3-0 MONOCRYL PLUS PS-2

## (undated) DEVICE — MANIFOLD 4 PORT

## (undated) DEVICE — SEE MEDLINE ITEM 146322

## (undated) DEVICE — Device

## (undated) DEVICE — TIP SUCTION YANKAUER

## (undated) DEVICE — NDL GUARD

## (undated) DEVICE — BLADE SURG STAINLESS STEEL #10

## (undated) DEVICE — PENCIL SMOKE EVAC ROCKER 70MM

## (undated) DEVICE — GLOVE PROTEXIS BLUE LATEX 7

## (undated) DEVICE — APPLICATOR CHLORAPREP ORN 26ML

## (undated) DEVICE — MARKER WRITESITE SKIN CHLRAPRP

## (undated) DEVICE — HANDLE DEVON RIGID OR LIGHT

## (undated) DEVICE — SUT JJ41G O VICRYL

## (undated) DEVICE — GLOVE PROTEXIS BLUE LATEX 8

## (undated) DEVICE — GLOVE PROTEXIS LTX MICRO  7

## (undated) DEVICE — DRESSING GAUZE XEROFORM 5X9

## (undated) DEVICE — GLOVE PROTEXIS BLUE LATEX 7.5

## (undated) DEVICE — KIT SURGICAL TURNOVER

## (undated) DEVICE — ELECTRODE PATIENT RETURN DISP

## (undated) DEVICE — SUT CTD VICRYL 3-0 CR/SH

## (undated) DEVICE — SPONGE LAP STRL 18X18IN

## (undated) DEVICE — BLADE SURG STAINLESS STEEL #15